# Patient Record
Sex: FEMALE | URBAN - METROPOLITAN AREA
[De-identification: names, ages, dates, MRNs, and addresses within clinical notes are randomized per-mention and may not be internally consistent; named-entity substitution may affect disease eponyms.]

---

## 2022-08-30 ENCOUNTER — APPOINTMENT (OUTPATIENT)
Dept: URBAN - METROPOLITAN AREA CLINIC 206 | Age: 37
Setting detail: DERMATOLOGY
End: 2022-08-30

## 2022-08-30 DIAGNOSIS — Z41.9 ENCOUNTER FOR PROCEDURE FOR PURPOSES OTHER THAN REMEDYING HEALTH STATE, UNSPECIFIED: ICD-10-CM

## 2022-08-30 DIAGNOSIS — L20.84 INTRINSIC (ALLERGIC) ECZEMA: ICD-10-CM

## 2022-08-30 DIAGNOSIS — L81.5 LEUKODERMA, NOT ELSEWHERE CLASSIFIED: ICD-10-CM

## 2022-08-30 PROCEDURE — OTHER PRESCRIPTION: OTHER

## 2022-08-30 PROCEDURE — 99204 OFFICE O/P NEW MOD 45 MIN: CPT

## 2022-08-30 PROCEDURE — OTHER MEDICATION COUNSELING: OTHER

## 2022-08-30 PROCEDURE — OTHER COUNSELING: OTHER

## 2022-08-30 RX ORDER — TRIAMCINOLONE ACETONIDE 1 MG/G
CREAM TOPICAL BID
Qty: 453.6 | Refills: 2 | Status: ERX | COMMUNITY
Start: 2022-08-30

## 2022-08-30 RX ORDER — TRETIONIN 0.25 MG/G
CREAM TOPICAL QHS
Qty: 20 | Refills: 3 | Status: ERX | COMMUNITY
Start: 2022-08-30

## 2022-08-30 ASSESSMENT — LOCATION DETAILED DESCRIPTION DERM
LOCATION DETAILED: RIGHT PROXIMAL POSTERIOR THIGH
LOCATION DETAILED: INFERIOR MID FOREHEAD
LOCATION DETAILED: LEFT DISTAL POSTERIOR THIGH
LOCATION DETAILED: RIGHT LATERAL BUCCAL CHEEK
LOCATION DETAILED: LEFT ANTECUBITAL SKIN
LOCATION DETAILED: RIGHT MEDIAL TRAPEZIAL NECK
LOCATION DETAILED: LEFT CENTRAL BUCCAL CHEEK

## 2022-08-30 ASSESSMENT — LOCATION SIMPLE DESCRIPTION DERM
LOCATION SIMPLE: POSTERIOR NECK
LOCATION SIMPLE: RIGHT CHEEK
LOCATION SIMPLE: LEFT POSTERIOR THIGH
LOCATION SIMPLE: INFERIOR FOREHEAD
LOCATION SIMPLE: LEFT CHEEK
LOCATION SIMPLE: LEFT UPPER ARM
LOCATION SIMPLE: RIGHT POSTERIOR THIGH

## 2022-08-30 ASSESSMENT — LOCATION ZONE DERM
LOCATION ZONE: ARM
LOCATION ZONE: LEG
LOCATION ZONE: FACE
LOCATION ZONE: NECK

## 2022-08-30 NOTE — HPI: DISCOLORATION
How Severe Is Your Skin Discoloration?: moderate
Additional History: Patient would like to discuss face care regimen. Currently using [employer] face wash/toner/moisturizer.

## 2022-08-30 NOTE — PROCEDURE: MEDICATION COUNSELING
Nsaids Counseling: NSAID Counseling: I discussed with the patient that NSAIDs should be taken with food. Prolonged use of NSAIDs can result in the development of stomach ulcers.  Patient advised to stop taking NSAIDs if abdominal pain occurs.  The patient verbalized understanding of the proper use and possible adverse effects of NSAIDs.  All of the patient's questions and concerns were addressed. Ketoconazole Counseling:   Patient counseled regarding improving absorption with orange juice.  Adverse effects include but are not limited to breast enlargement, headache, diarrhea, nausea, upset stomach, liver function test abnormalities, taste disturbance, and stomach pain.  There is a rare possibility of liver failure that can occur when taking ketoconazole. The patient understands that monitoring of LFTs may be required, especially at baseline. The patient verbalized understanding of the proper use and possible adverse effects of ketoconazole.  All of the patient's questions and concerns were addressed.

## 2022-08-30 NOTE — PROCEDURE: MEDICATION COUNSELING
Need for prophylactic measure Rifampin Pregnancy And Lactation Text: This medication is Pregnancy Category C and it isn't know if it is safe during pregnancy. It is also excreted in breast milk and should not be used if you are breast feeding.

## 2022-08-30 NOTE — PROCEDURE: MEDICATION COUNSELING
Exposure date: 8/23/21  COVID tested normal on 8/27/21  Asymptomatic, only scratchy voice, denies sore throat  Does not need to be in quarantine with negative test result and no symptoms  Advised to call if any symptoms come on  Erivedge Counseling- I discussed with the patient the risks of Erivedge including but not limited to nausea, vomiting, diarrhea, constipation, weight loss, changes in the sense of taste, decreased appetite, muscle spasms, and hair loss.  The patient verbalized understanding of the proper use and possible adverse effects of Erivedge.  All of the patient's questions and concerns were addressed.

## 2023-01-01 NOTE — PROCEDURE: MEDICATION COUNSELING
Patient came into office for administration of FLU vaccine; see immunization records for details; tolerated well; left immediately following; no 15 min OBS.     Aklief Pregnancy And Lactation Text: It is unknown if this medication is safe to use during pregnancy.  It is unknown if this medication is excreted in breast milk.  Breastfeeding women should use the topical cream on the smallest area of the skin for the shortest time needed while breastfeeding.  Do not apply to nipple and areola.

## 2025-07-14 NOTE — PROCEDURE: MEDICATION COUNSELING
HEALTHY DIETARY RECOMMENDATIONS    Opt for foods with a low glycemic index (GI), such as whole grains, legumes, non-starchy vegetables and fruits like berries. (Blue, black, pomegranate, purple cabbage, beets)  Reduce intake of refined carbohydrates and sugary foods like white bread, sugary snacks and sugary beverages.   Engage in aerobic exercises like brisk walking, jogging, cycling or swimming for at least 30 minutes most days of the week.   Incorporate resistance training exercises such as weightlifting or bodyweight exercises, at least two to three times per week to build muscle mass, which can enhance insulin sensitivity and metabolic rate.  Consider intermittent fasting protocols such as time-restricted eating or alternate-day fasting.   Include sources of healthy fats in your diet such as avocados, nuts, seeds, olive oil and fatty fish like salmon.   Aim for 0.8-1 gram of protein per kilogram of body weight per day, distributed evenly throughout your meals.   Take a 10-15 minute brisk walk after each meal, especially dinner.  Incorporate HIIT workouts into your exercise routine 2-3 times per week.   Consider adding an omega-3 fatty acid supplement, such as fish oil or algae oil, to your daily regimen.   Get regular sun exposure or consider taking a vitamin D supplement if you have low levels.  Incorporate apple cider vinegar into your diet by mixing 1-2 tablespoons with water and consuming it before meals.   Include magnesium-rich foods in your diet, such as spinach, almonds, cashews and pumpkin seeds.   Limit liquid calories such as soda, fruit juice and sweetened coffee drinks.  Use spices like cinnamon, turmeric and favian in your cooking and beverages. These spices contain compounds with potential insulin-sensitizing effects.  Incorporate bitter foods like bitter melon, arugula, dandelion greens and bitter herbs into your diet.   Do cold showers or ice baths. Cold exposure has been shown to activate  brown adipose tissue, which can enhance insulin sensitivity and metabolic rate.   Glycine improves insulin resistance due to its anti-inflammatory properties and its ability to influence glucose metabolism and liver function. Eat meat, egg whites and seaweed.   Drink plenty of water throughout the day to promote adequate blood flow to tissues.   Practice stress-reduction techniques such as deep breathing, meditation, yoga or salvatore chi.  21. Aim for 7-9 hours of quality sleep per night to support hormone regulation, metabolism and insulin sensitivity       22. Avoid NSAIDs, which include: Advil, Motrin, Ibuprofen, Aleve, Diclofenac, Meloxicam, Ketorolac         Healthy blood sugars are essential for longevity  Taking these steps will help you avoid becoming a statistic    Libtayo Counseling- I discussed with the patient the risks of Libtayo including but not limited to nausea, vomiting, diarrhea, and bone or muscle pain.  The patient verbalized understanding of the proper use and possible adverse effects of Libtayo.  All of the patient's questions and concerns were addressed.